# Patient Record
Sex: MALE | Race: WHITE | Employment: OTHER | URBAN - METROPOLITAN AREA
[De-identification: names, ages, dates, MRNs, and addresses within clinical notes are randomized per-mention and may not be internally consistent; named-entity substitution may affect disease eponyms.]

---

## 2023-05-13 ENCOUNTER — HOSPITAL ENCOUNTER (OUTPATIENT)
Dept: VASCULAR LAB | Age: 63
Discharge: HOME | End: 2023-05-13
Payer: MEDICARE

## 2023-05-13 DIAGNOSIS — I65.23 CAROTID STENOSIS, BILATERAL: ICD-10-CM

## 2023-05-13 DIAGNOSIS — Z82.49 FAMILY HISTORY OF ABDOMINAL AORTIC ANEURYSM: ICD-10-CM

## 2023-05-13 PROCEDURE — 76706 US ABDL AORTA SCREEN AAA: CPT

## 2023-05-13 PROCEDURE — 93880 EXTRACRANIAL BILAT STUDY: CPT

## 2025-04-15 ENCOUNTER — HOSPITAL ENCOUNTER (OUTPATIENT)
Age: 65
Setting detail: OUTPATIENT SURGERY
Discharge: HOME | End: 2025-04-15
Attending: SURGERY | Admitting: SURGERY
Payer: MEDICARE

## 2025-04-15 VITALS
HEART RATE: 54 BPM | SYSTOLIC BLOOD PRESSURE: 114 MMHG | TEMPERATURE: 97 F | RESPIRATION RATE: 22 BRPM | WEIGHT: 315 LBS | HEIGHT: 69 IN | OXYGEN SATURATION: 95 % | DIASTOLIC BLOOD PRESSURE: 73 MMHG | BODY MASS INDEX: 46.65 KG/M2

## 2025-04-15 DIAGNOSIS — I87.2 PERIPHERAL VENOUS INSUFFICIENCY: ICD-10-CM

## 2025-04-15 LAB — ECHO BSA: 2.72 M2

## 2025-04-15 PROCEDURE — 7100000000 HC PACU RECOVERY - FIRST 15 MIN: Performed by: SURGERY

## 2025-04-15 PROCEDURE — 2720000010 HC SURG SUPPLY STERILE: Performed by: SURGERY

## 2025-04-15 PROCEDURE — 7100000001 HC PACU RECOVERY - ADDTL 15 MIN: Performed by: SURGERY

## 2025-04-15 PROCEDURE — C1894 INTRO/SHEATH, NON-LASER: HCPCS | Performed by: SURGERY

## 2025-04-15 PROCEDURE — 6360000002 HC RX W HCPCS: Performed by: SURGERY

## 2025-04-15 PROCEDURE — 36482 ENDOVEN THER CHEM ADHES 1ST: CPT

## 2025-04-15 RX ORDER — FUROSEMIDE 20 MG/1
20 TABLET ORAL DAILY
COMMUNITY

## 2025-04-15 RX ORDER — AMLODIPINE AND BENAZEPRIL HYDROCHLORIDE 5; 20 MG/1; MG/1
1 CAPSULE ORAL
COMMUNITY

## 2025-04-15 RX ORDER — AMOXICILLIN 500 MG/1
500 CAPSULE ORAL 2 TIMES DAILY
COMMUNITY
Start: 2025-04-08

## 2025-04-15 RX ORDER — ASPIRIN 81 MG/1
81 TABLET ORAL DAILY
COMMUNITY

## 2025-04-15 RX ORDER — LIDOCAINE HYDROCHLORIDE 10 MG/ML
INJECTION, SOLUTION INFILTRATION; PERINEURAL PRN
Status: DISCONTINUED | OUTPATIENT
Start: 2025-04-15 | End: 2025-04-15 | Stop reason: HOSPADM

## 2025-04-15 ASSESSMENT — PAIN - FUNCTIONAL ASSESSMENT
PAIN_FUNCTIONAL_ASSESSMENT: FACE, LEGS, ACTIVITY, CRY, AND CONSOLABILITY (FLACC)
PAIN_FUNCTIONAL_ASSESSMENT: 0-10

## 2025-04-15 ASSESSMENT — PAIN DESCRIPTION - DESCRIPTORS: DESCRIPTORS: DISCOMFORT

## 2025-04-15 NOTE — OP NOTE
[place] [address]                            OPERATIVE REPORT      PATIENT NAME: ANDREW RUST                : 1960  MED REC NO: [id number]                         ROOM: [facility]  ACCOUNT NO: [id number]                       ADMIT DATE: 04/15/2025  PROVIDER: Keo Naranjo MD      DATE OF PROCEDURE:  04/15/2025    SURGEON:  Keo Naranjo MD    PREOPERATIVE DIAGNOSIS:  Symptomatic right lower extremity venous insufficiency.    POSTOPERATIVE DIAGNOSIS:  Symptomatic right lower extremity venous insufficiency.    PROCEDURES:    1. Nonthermal endovenous ablation of the right greater saphenous vein with VenaSeal.  2. Percutaneous ultrasound-guided access to right greater saphenous vein.  3. Ultrasound imaging, right greater saphenous vein.    ANESTHESIA:  1% lidocaine.    ESTIMATED BLOOD LOSS:  Minimal.    COMPLICATIONS:  None.    OPERATIVE INDICATIONS:  The patient is a 65-year-old male who presents with symptomatic venous reflux in both lower extremities.  On the right side, he has significant venous reflux involving both the greater and lesser saphenous veins as well as the deep venous system involving the femoral and popliteal veins.  On the left side, he has significant reflux involving the greater saphenous vein.  Skin changes were noted bilaterally and the patient has had previous ulceration.  At this time, he is being taken to the operating room for elective nonthermal endovenous ablation of the right greater saphenous vein with VenaSeal.    OPERATIVE TECHNIQUE:  After informed consent had been obtained, the patient was brought to the operating room, placed in supine position, and the right leg was prepped and draped in usual sterile fashion.  Ultrasound imaging was carried out of the greater saphenous vein from the saphenofemoral junction to the ankle level.  The greater saphenous vein was found to be patent along

## 2025-04-15 NOTE — DISCHARGE INSTRUCTIONS
Call office to set up follow-up doppler.  Can shower Thursday afternoon, can remove dressing in two days. After removing dressing apply compression hose.  Watch for signs of infection:  redness, swelling, drainage, fever.  Do not submerge in water till fully healed about 3 weeks.  Any questions or concerns, call office.  If you were to have chest pain, shortness of breathe, or if you were to pass out, call [phone].

## 2025-04-15 NOTE — BRIEF OP NOTE
Brief Postoperative Note      Patient: Kendall Adair  YOB: 1960  MRN: [id number]    Date of Procedure: 4/15/2025    Pre-Op Diagnosis Codes:      * Peripheral venous insufficiency [I87.2]    Post-Op Diagnosis: Same       Procedure(s): Nonthermal endovenous ablation of the right greater saphenous vein with VENASEAL  Percutaneous ultrasound-guided access right greater saphenous vein  Ultrasound imaging right greater saphenous vein    Surgeon(s):  Keo Naranjo MD    Assistant:  * No surgical staff found *    Anesthesia: Local    Estimated Blood Loss (mL): Minimal    Complications: None    Specimens:   * No specimens in log *    Implants:  * No implants in log *      Drains: * No LDAs found *    Findings:  Infection Present At Time Of Surgery (PATOS) (choose all levels that have infection present):  No infection present  Other Findings: Successful endovenous ablation of the right greater saphenous vein without evidence of deep venous thrombosis.    Electronically signed by Keo Naranjo MD on 4/15/2025 at 11:56 AM

## 2025-04-15 NOTE — H&P
Interval H&P Note    Pt Name: Kendall Adair  MRN: [id number]  YOB: 1960  Date of evaluation: 4/15/2025      [x] I have reviewed in epic the Vascular Progress Note by Dr Keo Naranjo dated 3/31/25 attached below for the Interval History and Physical note.     [x] I have examined  Kendall Adair, a 65 y.o. male who presents for his scheduled LOCAL VENASEAL by Keo Naranjo MD in Cath Lab for Peripheral venous insufficiency. The patient denies new health changes, fever, chills, wheezing, cough, increased SOB, chest pain, open sores or wounds. Last Eliquis 4/13/25     Allergies:  Patient has no known allergies.    Medications:    Prior to Admission medications    Medication Sig Start Date End Date Taking? Authorizing Provider   amLODIPine-benazepril (LOTREL) 5-20 MG per capsule Take 1 capsule by mouth every morning (before breakfast)   Yes ProviderEdward MD   aspirin 81 MG EC tablet Take 1 tablet by mouth daily   Yes ProviderEdward MD   furosemide (LASIX) 20 MG tablet Take 1 tablet by mouth daily   Yes ProviderEdward MD   amoxicillin (AMOXIL) 500 MG capsule Take 1 capsule by mouth 2 times daily 4/8/25  Yes ProviderEdward MD   apixaban (ELIQUIS) 5 MG TABS tablet Take 1 tablet by mouth 2 times daily    ProviderEdward MD      Vital signs: BP (!) 147/90   Pulse 63   Temp 97.5 °F (36.4 °C)   Resp 20   Ht 1.753 m (5' 9\")   Wt (!) 151.5 kg (334 lb)   SpO2 95%   BMI 49.32 kg/m²      This is a 65 y.o.morbidly obese  male who is pleasant, cooperative, alert and oriented x3, in no acute distress.    Heart: Heart sounds are distant but normal.  HR 63 regular rate and rhythm without murmur, gallop or rub.  No carotid bruits   Lungs: SpO2 95% Normal respiratory effort with equal expansion with diminished bibasilar breath sounds, unlabored at rest w/o use of accessory muscles or wheezes bilaterally No CVA tenderness  Abdomen: obese, soft, nontender, nondistended with bowel sounds .